# Patient Record
Sex: FEMALE | URBAN - METROPOLITAN AREA
[De-identification: names, ages, dates, MRNs, and addresses within clinical notes are randomized per-mention and may not be internally consistent; named-entity substitution may affect disease eponyms.]

---

## 2024-11-18 ENCOUNTER — LAB OUTSIDE AN ENCOUNTER (OUTPATIENT)
Dept: URBAN - METROPOLITAN AREA CLINIC 113 | Facility: CLINIC | Age: 34
End: 2024-11-18

## 2024-11-18 ENCOUNTER — OFFICE VISIT (OUTPATIENT)
Dept: URBAN - METROPOLITAN AREA CLINIC 113 | Facility: CLINIC | Age: 34
End: 2024-11-18
Payer: COMMERCIAL

## 2024-11-18 ENCOUNTER — DASHBOARD ENCOUNTERS (OUTPATIENT)
Age: 34
End: 2024-11-18

## 2024-11-18 VITALS
HEIGHT: 64 IN | TEMPERATURE: 97.5 F | SYSTOLIC BLOOD PRESSURE: 107 MMHG | DIASTOLIC BLOOD PRESSURE: 84 MMHG | BODY MASS INDEX: 34.66 KG/M2 | RESPIRATION RATE: 18 BRPM | WEIGHT: 203 LBS | HEART RATE: 105 BPM

## 2024-11-18 DIAGNOSIS — K52.9 COLITIS: ICD-10-CM

## 2024-11-18 DIAGNOSIS — K62.5 BRIGHT RED BLOOD PER RECTUM: ICD-10-CM

## 2024-11-18 DIAGNOSIS — R10.84 GENERALIZED ABDOMINAL PAIN: ICD-10-CM

## 2024-11-18 DIAGNOSIS — R19.7 DIARRHEA: ICD-10-CM

## 2024-11-18 DIAGNOSIS — R11.2 NAUSEA AND VOMITING: ICD-10-CM

## 2024-11-18 PROCEDURE — 99204 OFFICE O/P NEW MOD 45 MIN: CPT | Performed by: NURSE PRACTITIONER

## 2024-11-18 RX ORDER — OXYCODONE HYDROCHLORIDE 5 MG/1
1 TABLET AS NEEDED TABLET ORAL
Status: ACTIVE | COMMUNITY

## 2024-11-18 RX ORDER — ONDANSETRON 4 MG/1
1 TABLET ON THE TONGUE AND ALLOW TO DISSOLVE TABLET, ORALLY DISINTEGRATING ORAL ONCE A DAY
Status: ACTIVE | COMMUNITY

## 2024-11-18 RX ORDER — ONDANSETRON 8 MG/1
1 TABLET ON THE TONGUE AND ALLOW TO DISSOLVE  AS NEEDED TABLET, ORALLY DISINTEGRATING ORAL EVERY 6 HOURS
Qty: 60 TABLETS | Refills: 3 | OUTPATIENT
Start: 2024-11-18

## 2024-11-18 RX ORDER — SODIUM, POTASSIUM,MAG SULFATES 17.5-3.13G
177ML AS DIRECTED SOLUTION, RECONSTITUTED, ORAL ORAL
Qty: 1 KIT | Refills: 0 | OUTPATIENT
Start: 2024-11-18 | End: 2024-11-20

## 2024-11-18 RX ORDER — HYOSCYAMINE SULFATE 0.12 MG/1
PLACE 1 TABLET UNDER THE TONGUE AND ALLOW TO DISSOLVE AS NEEDED FOR ABDOMINAL PAIN TABLET SUBLINGUAL
Qty: 45 | Refills: 1 | OUTPATIENT
Start: 2024-11-18 | End: 2024-12-08

## 2024-11-18 RX ORDER — DICYCLOMINE HYDROCHLORIDE 10 MG/1
2 CAPSULES CAPSULE ORAL THREE TIMES A DAY
Status: ACTIVE | COMMUNITY

## 2024-11-18 NOTE — HPI-TODAY'S VISIT:
34-year-old female presenting for evaluation of abdominal pain and diarrhea. About 3 weeks ago, she experienced the onset of diarrhea followed by a significant lower abdominal pain.  She was seen in the ER at which time imaging findings were apparently concerning for mild anterior colitis.  Her white blood cell count was elevated at 17.  She was discharged with metronidazole with little relief to this point.  She was seen in the ER again 3 days ago due to persistent symptoms, with apparently similar findings.  She complains of low abdominal pain, diarrhea with significant urgency resulting in incontinence.  She reports nausea and vomiting which she attributes to the pain.  She is having 10-20 bowel movements per day.  Within the last 2 days, she has been passing dark red blood with clots in the toilet.  She has avoided use of antidiarrheals.  Interestingly, she was hospitalized for sepsis and pancolitis about 2 years ago at [facility] for 7 days.  She was asymptomatic until the symptoms recurred several weeks ago. She denies any antibiotic use or medication changes prior to recent symptom onset. Dicyclomine has provided little relief of the abdominal pain. She has been unable to eat.

## 2024-11-19 ENCOUNTER — OFFICE VISIT (OUTPATIENT)
Dept: URBAN - METROPOLITAN AREA SURGERY CENTER 25 | Facility: SURGERY CENTER | Age: 34
End: 2024-11-19
Payer: COMMERCIAL

## 2024-11-19 ENCOUNTER — TELEPHONE ENCOUNTER (OUTPATIENT)
Dept: URBAN - METROPOLITAN AREA CLINIC 113 | Facility: CLINIC | Age: 34
End: 2024-11-19

## 2024-11-19 ENCOUNTER — CLAIMS CREATED FROM THE CLAIM WINDOW (OUTPATIENT)
Dept: URBAN - METROPOLITAN AREA CLINIC 4 | Facility: CLINIC | Age: 34
End: 2024-11-19
Payer: COMMERCIAL

## 2024-11-19 DIAGNOSIS — K63.89 OTHER SPECIFIED DISEASES OF INTESTINE: ICD-10-CM

## 2024-11-19 DIAGNOSIS — K52.3 INDETERMINATE COLITIS: ICD-10-CM

## 2024-11-19 DIAGNOSIS — K51.011 ULCERATIVE PANCOLITIS WITH RECTAL BLEEDING: ICD-10-CM

## 2024-11-19 PROBLEM — 444548001: Status: ACTIVE | Noted: 2024-11-19

## 2024-11-19 PROCEDURE — 88305 TISSUE EXAM BY PATHOLOGIST: CPT | Performed by: PATHOLOGY

## 2024-11-19 PROCEDURE — 45380 COLONOSCOPY AND BIOPSY: CPT | Performed by: STUDENT IN AN ORGANIZED HEALTH CARE EDUCATION/TRAINING PROGRAM

## 2024-11-19 PROCEDURE — 00811 ANES LWR INTST NDSC NOS: CPT | Performed by: ANESTHESIOLOGY

## 2024-11-19 RX ORDER — ONDANSETRON 4 MG/1
1 TABLET ON THE TONGUE AND ALLOW TO DISSOLVE TABLET, ORALLY DISINTEGRATING ORAL ONCE A DAY
Status: ACTIVE | COMMUNITY

## 2024-11-19 RX ORDER — SODIUM, POTASSIUM,MAG SULFATES 17.5-3.13G
177ML AS DIRECTED SOLUTION, RECONSTITUTED, ORAL ORAL
Qty: 1 KIT | Refills: 0 | Status: ACTIVE | COMMUNITY
Start: 2024-11-18 | End: 2024-11-20

## 2024-11-19 RX ORDER — OXYCODONE HYDROCHLORIDE 5 MG/1
1 TABLET AS NEEDED TABLET ORAL
Status: ACTIVE | COMMUNITY

## 2024-11-19 RX ORDER — DICYCLOMINE HYDROCHLORIDE 10 MG/1
2 CAPSULES CAPSULE ORAL THREE TIMES A DAY
Status: ACTIVE | COMMUNITY

## 2024-11-19 RX ORDER — ONDANSETRON 8 MG/1
1 TABLET ON THE TONGUE AND ALLOW TO DISSOLVE  AS NEEDED TABLET, ORALLY DISINTEGRATING ORAL EVERY 6 HOURS
Qty: 60 TABLETS | Refills: 3 | Status: ACTIVE | COMMUNITY
Start: 2024-11-18

## 2024-11-19 RX ORDER — PREDNISONE 10 MG/1
TAKE 4 TABS PO X 1 WEEK, TAKE 3 TABS PO X 1 WEEK, TAKE 2 TABS PO X 1 WEEK, TAKE 1 TAB PO X 1 WEEK TABLET ORAL ONCE A DAY
Qty: 70 | Refills: 0 | OUTPATIENT
Start: 2024-11-19 | End: 2024-12-19

## 2024-11-19 RX ORDER — HYOSCYAMINE SULFATE 0.12 MG/1
PLACE 1 TABLET UNDER THE TONGUE AND ALLOW TO DISSOLVE AS NEEDED FOR ABDOMINAL PAIN TABLET SUBLINGUAL
Qty: 45 | Refills: 1 | Status: ACTIVE | COMMUNITY
Start: 2024-11-18 | End: 2024-12-08

## 2024-11-22 ENCOUNTER — TELEPHONE ENCOUNTER (OUTPATIENT)
Dept: URBAN - METROPOLITAN AREA CLINIC 113 | Facility: CLINIC | Age: 34
End: 2024-11-22

## 2024-11-22 RX ORDER — DICYCLOMINE HYDROCHLORIDE 10 MG/1
1 CAPSULE 30 MINUTES BEFORE EATING CAPSULE ORAL
Qty: 120 | Refills: 0

## 2024-11-26 ENCOUNTER — TELEPHONE ENCOUNTER (OUTPATIENT)
Dept: URBAN - METROPOLITAN AREA CLINIC 113 | Facility: CLINIC | Age: 34
End: 2024-11-26

## 2024-12-01 ENCOUNTER — CLAIMS CREATED FROM THE CLAIM WINDOW (OUTPATIENT)
Dept: URBAN - METROPOLITAN AREA MEDICAL CENTER 43 | Facility: MEDICAL CENTER | Age: 34
End: 2024-12-01
Payer: COMMERCIAL

## 2024-12-01 DIAGNOSIS — K52.3 COLITIS, INDETERMINATE: ICD-10-CM

## 2024-12-01 DIAGNOSIS — M06.9 RHEUMATOID ARTHRITIS, INVOLVING UNSPECIFIED SITE, UNSPECIFIED WHETHER RHEUMATOID FACTOR PRESENT: ICD-10-CM

## 2024-12-01 DIAGNOSIS — R10.84 ABDOMINAL CRAMPING, GENERALIZED: ICD-10-CM

## 2024-12-01 PROCEDURE — 99233 SBSQ HOSP IP/OBS HIGH 50: CPT | Performed by: INTERNAL MEDICINE

## 2024-12-01 PROCEDURE — 99232 SBSQ HOSP IP/OBS MODERATE 35: CPT | Performed by: INTERNAL MEDICINE

## 2024-12-02 ENCOUNTER — CLAIMS CREATED FROM THE CLAIM WINDOW (OUTPATIENT)
Dept: URBAN - METROPOLITAN AREA MEDICAL CENTER 43 | Facility: MEDICAL CENTER | Age: 34
End: 2024-12-02
Payer: COMMERCIAL

## 2024-12-02 ENCOUNTER — OFFICE VISIT (OUTPATIENT)
Dept: URBAN - METROPOLITAN AREA CLINIC 113 | Facility: CLINIC | Age: 34
End: 2024-12-02

## 2024-12-02 DIAGNOSIS — K52.3 COLITIS, INDETERMINATE: ICD-10-CM

## 2024-12-02 DIAGNOSIS — R10.84 ABDOMINAL CRAMPING, GENERALIZED: ICD-10-CM

## 2024-12-02 DIAGNOSIS — M06.9 RHEUMATOID ARTHRITIS OF OTHER SITE, UNSPECIFIED WHETHER RHEUMATOID FACTOR PRESENT: ICD-10-CM

## 2024-12-02 PROCEDURE — 99232 SBSQ HOSP IP/OBS MODERATE 35: CPT | Performed by: INTERNAL MEDICINE

## 2024-12-03 ENCOUNTER — CLAIMS CREATED FROM THE CLAIM WINDOW (OUTPATIENT)
Dept: URBAN - METROPOLITAN AREA MEDICAL CENTER 43 | Facility: MEDICAL CENTER | Age: 34
End: 2024-12-03
Payer: COMMERCIAL

## 2024-12-03 DIAGNOSIS — K52.3 INDETERMINATE COLITIS: ICD-10-CM

## 2024-12-03 DIAGNOSIS — M06.9 RHEUMATOID ARTHRITIS, INVOLVING UNSPECIFIED SITE, UNSPECIFIED WHETHER RHEUMATOID FACTOR PRESENT: ICD-10-CM

## 2024-12-03 PROCEDURE — 99232 SBSQ HOSP IP/OBS MODERATE 35: CPT | Performed by: INTERNAL MEDICINE

## 2024-12-04 ENCOUNTER — TELEPHONE ENCOUNTER (OUTPATIENT)
Dept: URBAN - METROPOLITAN AREA CLINIC 113 | Facility: CLINIC | Age: 34
End: 2024-12-04

## 2024-12-04 ENCOUNTER — CLAIMS CREATED FROM THE CLAIM WINDOW (OUTPATIENT)
Dept: URBAN - METROPOLITAN AREA MEDICAL CENTER 43 | Facility: MEDICAL CENTER | Age: 34
End: 2024-12-04
Payer: COMMERCIAL

## 2024-12-04 DIAGNOSIS — K52.3 COLITIS, INDETERMINATE: ICD-10-CM

## 2024-12-04 PROCEDURE — 99232 SBSQ HOSP IP/OBS MODERATE 35: CPT | Performed by: INTERNAL MEDICINE

## 2025-01-06 ENCOUNTER — TELEPHONE ENCOUNTER (OUTPATIENT)
Dept: URBAN - METROPOLITAN AREA CLINIC 113 | Facility: CLINIC | Age: 35
End: 2025-01-06

## 2025-01-06 RX ORDER — MESALAMINE 1.2 G/1
2 TABLETS WITH A MEAL TABLET, DELAYED RELEASE ORAL ONCE A DAY
Qty: 180 TABLET | Refills: 3 | OUTPATIENT
Start: 2025-01-06 | End: 2026-01-01

## 2025-01-06 RX ORDER — PANTOPRAZOLE SODIUM 40 MG/1
1 TABLET 1/2 TO 1 HOUR BEFORE MORNING MEAL TABLET, DELAYED RELEASE ORAL ONCE A DAY
Qty: 90 TABLET | Refills: 1 | OUTPATIENT
Start: 2025-01-06

## 2025-01-15 ENCOUNTER — OFFICE VISIT (OUTPATIENT)
Dept: URBAN - METROPOLITAN AREA CLINIC 107 | Facility: CLINIC | Age: 35
End: 2025-01-15
Payer: COMMERCIAL

## 2025-01-15 VITALS
SYSTOLIC BLOOD PRESSURE: 151 MMHG | BODY MASS INDEX: 35.37 KG/M2 | HEIGHT: 64 IN | HEART RATE: 114 BPM | WEIGHT: 207.2 LBS | DIASTOLIC BLOOD PRESSURE: 115 MMHG | TEMPERATURE: 97.5 F

## 2025-01-15 DIAGNOSIS — K51.011 ULCERATIVE PANCOLITIS WITH RECTAL BLEEDING: ICD-10-CM

## 2025-01-15 PROCEDURE — 99214 OFFICE O/P EST MOD 30 MIN: CPT | Performed by: NURSE PRACTITIONER

## 2025-01-15 RX ORDER — ONDANSETRON 8 MG/1
1 TABLET ON THE TONGUE AND ALLOW TO DISSOLVE  AS NEEDED TABLET, ORALLY DISINTEGRATING ORAL EVERY 6 HOURS
Qty: 60 TABLETS | Refills: 3 | Status: ON HOLD | COMMUNITY
Start: 2024-11-18

## 2025-01-15 RX ORDER — OXYCODONE HYDROCHLORIDE 5 MG/1
1 TABLET AS NEEDED TABLET ORAL
Status: ON HOLD | COMMUNITY

## 2025-01-15 RX ORDER — MESALAMINE 1.2 G/1
2 TABLETS WITH A MEAL TABLET, DELAYED RELEASE ORAL ONCE A DAY
Qty: 180 TABLET | Refills: 3 | Status: ACTIVE | COMMUNITY
Start: 2025-01-06 | End: 2026-01-01

## 2025-01-15 RX ORDER — ONDANSETRON 4 MG/1
1 TABLET ON THE TONGUE AND ALLOW TO DISSOLVE TABLET, ORALLY DISINTEGRATING ORAL ONCE A DAY
Status: ON HOLD | COMMUNITY

## 2025-01-15 RX ORDER — DICYCLOMINE HYDROCHLORIDE 10 MG/1
1 CAPSULE 30 MINUTES BEFORE EATING CAPSULE ORAL
Qty: 120 | Refills: 0 | Status: ON HOLD | COMMUNITY

## 2025-01-15 RX ORDER — MESALAMINE 1.2 G/1
4 TABLETS TABLET, DELAYED RELEASE ORAL ONCE A DAY
Qty: 120 TABLET | Refills: 3 | OUTPATIENT
Start: 2025-01-16 | End: 2025-05-16

## 2025-01-15 RX ORDER — PANTOPRAZOLE SODIUM 40 MG/1
1 TABLET 1/2 TO 1 HOUR BEFORE MORNING MEAL TABLET, DELAYED RELEASE ORAL ONCE A DAY
Qty: 90 TABLET | Refills: 1 | Status: ON HOLD | COMMUNITY
Start: 2025-01-06

## 2025-01-15 NOTE — HPI-TODAY'S VISIT:
34-year-old female with a history of rheumatoid arthritis presenting for follow-up regarding colitis. She was seen in the office in November for evaluation of a recent exacerbation of low abdominal pain and diarrhea with associated stool urgency, incontinence, and red blood per rectum. CT in the ER reportedly demonstrated colitis. Her symptoms had been unchanged with a course of metronidazole. A diagnostic colonoscopy was planned. Ondansetron and hyoscyamine were provided to use as needed for relief of abdominal pain and nausea. Colonoscopy 11/19/2024:Diffuse severe inflammation was found in the rectum, sigmoid colon, descending colon, transverse colon, ascending colon and cecum secondary to ulcerative colitis.  Examined portion of the ileum was normal.  Internal nonbleeding hemorrhoids.  Colon biopsy showed patchy chronic active colitis, indeterminate type, negative for dysplasia.  Biopsies of the TI were negative.  A repeat colonoscopy was recommended in 1 year. She was then hospitalized 11/27/2024 due to worsening abdominal pain, diarrhea, rectal bleeding and fever, attributed to severe flare of indeterminate pancolitis/likely UC.  CT at an outside facility demonstrated long segment continuous wall thickening and inflammatory changes about the majority of the colon, most pronounced at the transverse and descending colon.  Stool studies were negative for C. difficile toxin, but did show a markedly elevated fecal calprotectin. Abdominal x-ray at that time showed mild gaseous distention of the ascending and transverse colon similar to prior, no evidence of bowel obstruction.  She was treated with IV antibiotics and steroids, and discharged home on oral mesalamine and prednisone.  She voiced reservations about starting biologic therapy. She was discharged on a prednisone 60mg taper, and is down to 10mg daily this week. She was also started on Lialda 2 tablets daily. Her symptoms have essentially resolved. She is having two bowel movements per day. No abdominal pain, diarrhea, stool urgency or blood in the stool. Labs 11/27/2024:Negative hepatitis B core antibody IgM and total.  Reactive hepatitis B surface antibody.  CRP 25.5.  Fecal calprotectin greater than 3000.  Indeterminate QuantiFERON TB Gold. She reports a negative PPD about 6 months ago.

## 2025-01-15 NOTE — HPI-OTHER HISTORIES
ER visit October 2024 d/t diarrhea and significant lower abdominal pain. Imaging findings were apparently concerning for mild anterior colitis. Her white blood cell count was elevated at 17. She was discharged with metronidazole without benefit.  Previously hospitalized for 7 days d/t sepsis and pancolitis, ~2022 at [facility].

## 2025-01-16 ENCOUNTER — TELEPHONE ENCOUNTER (OUTPATIENT)
Dept: URBAN - METROPOLITAN AREA CLINIC 113 | Facility: CLINIC | Age: 35
End: 2025-01-16

## 2025-03-12 ENCOUNTER — OFFICE VISIT (OUTPATIENT)
Dept: URBAN - METROPOLITAN AREA CLINIC 107 | Facility: CLINIC | Age: 35
End: 2025-03-12

## 2025-05-14 ENCOUNTER — OFFICE VISIT (OUTPATIENT)
Dept: URBAN - METROPOLITAN AREA CLINIC 107 | Facility: CLINIC | Age: 35
End: 2025-05-14

## 2025-06-26 ENCOUNTER — ERX REFILL RESPONSE (OUTPATIENT)
Dept: URBAN - METROPOLITAN AREA CLINIC 113 | Facility: CLINIC | Age: 35
End: 2025-06-26

## 2025-06-26 RX ORDER — PANTOPRAZOLE SODIUM 40 MG/1
1 TABLET 1/2 TO 1 HOUR BEFORE MORNING MEAL TABLET, DELAYED RELEASE ORAL ONCE A DAY
Qty: 90 TABLET | Refills: 1

## 2025-07-02 ENCOUNTER — OFFICE VISIT (OUTPATIENT)
Dept: URBAN - METROPOLITAN AREA CLINIC 107 | Facility: CLINIC | Age: 35
End: 2025-07-02
Payer: COMMERCIAL

## 2025-07-02 DIAGNOSIS — K51.011 ULCERATIVE PANCOLITIS WITH RECTAL BLEEDING: ICD-10-CM

## 2025-07-02 DIAGNOSIS — R19.7 DIARRHEA: ICD-10-CM

## 2025-07-02 PROCEDURE — 99214 OFFICE O/P EST MOD 30 MIN: CPT | Performed by: NURSE PRACTITIONER

## 2025-07-02 RX ORDER — MESALAMINE 1.2 G/1
2 TABLETS WITH A MEAL TABLET, DELAYED RELEASE ORAL ONCE A DAY
Qty: 180 TABLET | Refills: 3 | Status: ACTIVE | COMMUNITY
Start: 2025-01-06 | End: 2026-01-01

## 2025-07-02 RX ORDER — PANTOPRAZOLE SODIUM 40 MG/1
1 TABLET 1/2 TO 1 HOUR BEFORE MORNING MEAL TABLET, DELAYED RELEASE ORAL ONCE A DAY
Qty: 90 TABLET | Refills: 1 | Status: DISCONTINUED | COMMUNITY

## 2025-07-02 RX ORDER — HYOSCYAMINE SULFATE 0.38 MG/1
1 TABLET TABLET, EXTENDED RELEASE ORAL
Status: ACTIVE | COMMUNITY

## 2025-07-02 RX ORDER — MESALAMINE 1.2 G/1
4 TABLETS TABLET, DELAYED RELEASE ORAL ONCE A DAY
Qty: 120 TABLET | Refills: 3 | OUTPATIENT
Start: 2025-07-02

## 2025-07-02 RX ORDER — OXYCODONE HYDROCHLORIDE 5 MG/1
1 TABLET AS NEEDED TABLET ORAL
Status: DISCONTINUED | COMMUNITY

## 2025-07-02 RX ORDER — ONDANSETRON 8 MG/1
1 TABLET ON THE TONGUE AND ALLOW TO DISSOLVE  AS NEEDED TABLET, ORALLY DISINTEGRATING ORAL EVERY 6 HOURS
Qty: 60 TABLETS | Refills: 3 | Status: DISCONTINUED | COMMUNITY
Start: 2024-11-18

## 2025-07-02 RX ORDER — DICYCLOMINE HYDROCHLORIDE 10 MG/1
1 CAPSULE 30 MINUTES BEFORE EATING CAPSULE ORAL
Qty: 120 | Refills: 0 | Status: DISCONTINUED | COMMUNITY

## 2025-07-02 RX ORDER — ONDANSETRON 4 MG/1
1 TABLET ON THE TONGUE AND ALLOW TO DISSOLVE TABLET, ORALLY DISINTEGRATING ORAL ONCE A DAY
Status: DISCONTINUED | COMMUNITY

## 2025-07-02 NOTE — HPI-OTHER HISTORIES
ER visit October 2024 d/t diarrhea and significant lower abdominal pain. Imaging findings were apparently concerning for mild anterior colitis. Her white blood cell count was elevated at 17. She was discharged with metronidazole without benefit.  Previously hospitalized for 7 days d/t sepsis and pancolitis, ~2022 at [facility].

## 2025-07-02 NOTE — HPI-TODAY'S VISIT:
Follow-up visit 1/15/25 34-year-old female with a history of rheumatoid arthritis presenting for follow-up regarding colitis. She was seen in the office in November for evaluation of a recent exacerbation of low abdominal pain and diarrhea with associated stool urgency, incontinence, and red blood per rectum. CT in the ER reportedly demonstrated colitis. Her symptoms had been unchanged with a course of metronidazole. A diagnostic colonoscopy was planned. Ondansetron and hyoscyamine were provided to use as needed for relief of abdominal pain and nausea. Colonoscopy 11/19/2024:Diffuse severe inflammation was found in the rectum, sigmoid colon, descending colon, transverse colon, ascending colon and cecum secondary to ulcerative colitis.  Examined portion of the ileum was normal.  Internal nonbleeding hemorrhoids.  Colon biopsy showed patchy chronic active colitis, indeterminate type, negative for dysplasia.  Biopsies of the TI were negative.  A repeat colonoscopy was recommended in 1 year. She was then hospitalized 11/27/2024 due to worsening abdominal pain, diarrhea, rectal bleeding and fever, attributed to severe flare of indeterminate pancolitis/likely UC.  CT at an outside facility demonstrated long segment continuous wall thickening and inflammatory changes about the majority of the colon, most pronounced at the transverse and descending colon.  Stool studies were negative for C. difficile toxin, but did show a markedly elevated fecal calprotectin. Abdominal x-ray at that time showed mild gaseous distention of the ascending and transverse colon similar to prior, no evidence of bowel obstruction.  She was treated with IV antibiotics and steroids, and discharged home on oral mesalamine and prednisone.  She voiced reservations about starting biologic therapy. She was discharged on a prednisone 60mg taper, and is down to 10mg daily this week. She was also started on Lialda 2 tablets daily. Her symptoms have essentially resolved. She is having two bowel movements per day. No abdominal pain, diarrhea, stool urgency or blood in the stool. Labs 11/27/2024:Negative hepatitis B core antibody IgM and total.  Reactive hepatitis B surface antibody.  CRP 25.5.  Fecal calprotectin greater than 3000.  Indeterminate QuantiFERON TB Gold. She reports a negative PPD about 6 months ago. Follow-up visit 7/2/2025 She was seen in the office in January for follow-up regarding severe pancolitis, likely UC.  Her symptoms were stable on oral mesalamine and low-dose prednisone, following IV steroids while inpatient and oral prednisone taper.  She voiced reservation regarding biologic therapy due to potential side effects and long-term complications.  She was to complete the prednisone taper as prescribed and recommended to increase oral mesalamine to 4 tablets daily.  Tremfya was considered.  A repeat colonoscopy was recommended in 1 year. She did not increase the mesalamine following her last visit.  She remains on Neotic 2 tablets daily.  She typically has 1-2 bowel movements per day, with blood and mucus with most stools.  About once per month, she will experience an exacerbation of abdominal pain and diarrhea with increased frequency, urgency, mucus and blood in the stool.  This has resulted in incontinence on occasion.  During these flares, she will take 20 mg of prednisone daily for 2 to 3 days until symptoms subside. She does not eat and lies in bed for several days during this time.